# Patient Record
Sex: FEMALE | Race: WHITE | URBAN - METROPOLITAN AREA
[De-identification: names, ages, dates, MRNs, and addresses within clinical notes are randomized per-mention and may not be internally consistent; named-entity substitution may affect disease eponyms.]

---

## 2017-01-06 ENCOUNTER — ALLIED HEALTH/NURSE VISIT (OUTPATIENT)
Dept: NURSING | Facility: CLINIC | Age: 57
End: 2017-01-06

## 2017-01-06 DIAGNOSIS — Z23 NEED FOR MMR VACCINE: ICD-10-CM

## 2017-01-06 DIAGNOSIS — Z11.1 SCREENING EXAMINATION FOR PULMONARY TUBERCULOSIS: Primary | ICD-10-CM

## 2017-01-06 DIAGNOSIS — Z23 NEED FOR HEPATITIS B VACCINATION: ICD-10-CM

## 2017-01-06 DIAGNOSIS — Z23 NEED FOR VARICELLA VACCINE: ICD-10-CM

## 2017-01-06 PROCEDURE — 90707 MMR VACCINE SC: CPT

## 2017-01-06 PROCEDURE — 90471 IMMUNIZATION ADMIN: CPT

## 2017-01-06 PROCEDURE — 90744 HEPB VACC 3 DOSE PED/ADOL IM: CPT

## 2017-01-06 PROCEDURE — 90472 IMMUNIZATION ADMIN EACH ADD: CPT

## 2017-01-06 PROCEDURE — 90716 VAR VACCINE LIVE SUBQ: CPT

## 2017-01-06 PROCEDURE — 86580 TB INTRADERMAL TEST: CPT

## 2017-01-06 PROCEDURE — 99207 ZZC NO CHARGE NURSE ONLY: CPT

## 2017-04-04 ENCOUNTER — OFFICE VISIT (OUTPATIENT)
Dept: URGENT CARE | Facility: URGENT CARE | Age: 57
End: 2017-04-04
Payer: COMMERCIAL

## 2017-04-04 VITALS
SYSTOLIC BLOOD PRESSURE: 126 MMHG | TEMPERATURE: 98.2 F | HEIGHT: 62 IN | DIASTOLIC BLOOD PRESSURE: 68 MMHG | WEIGHT: 156 LBS | OXYGEN SATURATION: 96 % | BODY MASS INDEX: 28.71 KG/M2 | HEART RATE: 95 BPM | RESPIRATION RATE: 16 BRPM

## 2017-04-04 DIAGNOSIS — S51.851A CAT BITE OF FOREARM, RIGHT, INITIAL ENCOUNTER: Primary | ICD-10-CM
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
## 2017-04-04 PROCEDURE — 99203 OFFICE O/P NEW LOW 30 MIN: CPT | Performed by: FAMILY MEDICINE

## 2017-04-04 RX ORDER — CLINDAMYCIN HCL 300 MG
300 CAPSULE ORAL
COMMUNITY

## 2017-04-04 RX ORDER — CLINDAMYCIN HCL 300 MG
300 CAPSULE ORAL 3 TIMES DAILY
Qty: 6 CAPSULE | Refills: 0 | Status: SHIPPED | OUTPATIENT
Start: 2017-04-04 | End: 2017-04-06

## 2017-04-04 NOTE — MR AVS SNAPSHOT
"              After Visit Summary   2017    Cynthia Rees    MRN: [id number]           Patient Information     Date Of Birth          1960        Visit Information        Provider Department      2017 12:30 PM Estephania Juárez MD [facility]        Today's Diagnoses     Cat bite of forearm, right, initial encounter    -  1       Follow-ups after your visit        Who to contact     If you have questions or need follow up information about today's clinic visit or your schedule please contact [facility] directly at [phone].  Normal or non-critical lab and imaging results will be communicated to you by [employer], letter or phone within 4 business days after the clinic has received the results. If you do not hear from us within 7 days, please contact the clinic through [employer] or phone. If you have a critical or abnormal lab result, we will notify you by phone as soon as possible.  Submit refill requests through [employer] or call your pharmacy and they will forward the refill request to us. Please allow 3 business days for your refill to be completed.          Additional Information About Your Visit        MyChart Information     [employer] lets you send messages to your doctor, view your test results, renew your prescriptions, schedule appointments and more. To sign up, go to [email or web address] . Click on \"Log in\" on the left side of the screen, which will take you to the Welcome page. Then click on \"Sign up Now\" on the right side of the page.     You will be asked to enter the access code listed below, as well as some personal information. Please follow the directions to create your username and password.     Your access code is: [email or web address]  Expires: 7/3/2017  1:11 PM     Your access code will  in 90 days. If you need help or a new code, please call your [place] clinic or [phone].        Care EveryWhere ID     This is your Care EveryWhere ID. This could " "be used by other organizations to access your [facility] medical records  [id number]        Your Vitals Were     Pulse Temperature Respirations Height Pulse Oximetry Breastfeeding?    95 98.2  F (36.8  C) (Oral) 16 5' 2\" (1.575 m) 96% No    BMI (Body Mass Index)                   28.53 kg/m2            Blood Pressure from Last 3 Encounters:   04/04/17 126/68    Weight from Last 3 Encounters:   04/04/17 156 lb (70.8 kg)              Today, you had the following     No orders found for display         Today's Medication Changes          These changes are accurate as of: 4/4/17  1:11 PM.  If you have any questions, ask your nurse or doctor.               These medicines have changed or have updated prescriptions.        Dose/Directions    * clindamycin 300 MG capsule   Commonly known as:  CLEOCIN   This may have changed:  Another medication with the same name was added. Make sure you understand how and when to take each.   Changed by:  Estephania Juárez MD        Dose:  300 mg   Take 300 mg by mouth   Refills:  0       * clindamycin 300 MG capsule   Commonly known as:  CLEOCIN   This may have changed:  You were already taking a medication with the same name, and this prescription was added. Make sure you understand how and when to take each.   Used for:  Cat bite of forearm, right, initial encounter   Changed by:  Estephania Juárez MD        Dose:  300 mg   Take 1 capsule (300 mg) by mouth 3 times daily for 2 days   Quantity:  6 capsule   Refills:  0       * Notice:  This list has 2 medication(s) that are the same as other medications prescribed for you. Read the directions carefully, and ask your doctor or other care provider to review them with you.         Where to get your medicines      These medications were sent to [facility] - [address]   [address]  [address], [place] [address]     Phone:  [phone]     clindamycin 300 MG capsule                " Primary Care Provider    None Specified       No primary provider on file.        Thank you!     Thank you for choosing [facility]  for your care. Our goal is always to provide you with excellent care. Hearing back from our patients is one way we can continue to improve our services. Please take a few minutes to complete the written survey that you may receive in the mail after your visit with us. Thank you!             Your Updated Medication List - Protect others around you: Learn how to safely use, store and throw away your medicines at www.disposemymeds.org.          This list is accurate as of: 4/4/17  1:11 PM.  Always use your most recent med list.                   Brand Name Dispense Instructions for use    * clindamycin 300 MG capsule    CLEOCIN     Take 300 mg by mouth       * clindamycin 300 MG capsule    CLEOCIN    6 capsule    Take 1 capsule (300 mg) by mouth 3 times daily for 2 days       * Notice:  This list has 2 medication(s) that are the same as other medications prescribed for you. Read the directions carefully, and ask your doctor or other care provider to review them with you.

## 2017-04-04 NOTE — NURSING NOTE
"Chief Complaint   Patient presents with     Cat Bite     her cat bit her at 11:18 last night       Initial /68 (BP Location: Right arm, Patient Position: Chair, Cuff Size: Adult Regular)  Pulse 95  Temp 98.2  F (36.8  C) (Oral)  Resp 16  Ht 5' 2\" (1.575 m)  Wt 156 lb (70.8 kg)  SpO2 96%  Breastfeeding? No  BMI 28.53 kg/m2 Estimated body mass index is 28.53 kg/(m^2) as calculated from the following:    Height as of this encounter: 5' 2\" (1.575 m).    Weight as of this encounter: 156 lb (70.8 kg).  Medication Reconciliation: angelito Yadav CMA (AAMA)    Her cat gave her a \"love bite\" last night and has been progressively swelling since.  Redness, war and tender to the touch.  Took two 300 mg clindamycin since she flew in from [place] this morning.    "

## 2017-04-04 NOTE — PROGRESS NOTES
"SUBJECTIVE:  Cynthia Rees is a 56 year old female who presents with a chief complaint of an animal bite on the right forearm.  She was bitten by her cat around 11 pm last night.   Cicumstances of bite: unprovoked, cat seemed to be dreaming when she was bitten.  Severity: bite with skin break.  Animal's immunizations up to date   Associated symptoms: redness noted, increasing pain     She had some clindamycin on hand at home and has taken two doses (300 mg each) so far.  Hasn't seen any red streaks from the area yet.  She did gay [place] border with a pen.    No past medical history on file.    Social History   Substance Use Topics     Smoking status: Never Smoker     Smokeless tobacco: Not on file     Alcohol use 4.8 oz/week     8 Standard drinks or equivalent per week       ROS:  Review of systems negative except as stated above.    OBJECTIVE:  /68 (BP Location: Right arm, Patient Position: Chair, Cuff Size: Adult Regular)  Pulse 95  Temp 98.2  F (36.8  C) (Oral)  Resp 16  Ht 5' 2\" (1.575 m)  Wt 156 lb (70.8 kg)  SpO2 96%  Breastfeeding? No  BMI 28.53 kg/m2  GENERAL: healthy, alert no acute distress  SKIN: ~6 cm circular patch of erythema with warmth, tenderness, and induration but no fluctuance on R forearm.  No red streak extending form this area.  There are puncture marks consistent with a cat bite.  EXT: no tenderness outside the area of erythema.  No problems with wrist, elbow or hand.    ASSESSMENT:  Cat bite    PLAN:  Continue clindamycin 300 mg TID x 7 days total.  Small prescription written to bring patient's supply of this medication up to the correct amount.  Follow up with primary care provider back home PR[place] or return to  if any signs of infection develop before heading home to [place].    "